# Patient Record
Sex: FEMALE | Employment: STUDENT | URBAN - METROPOLITAN AREA
[De-identification: names, ages, dates, MRNs, and addresses within clinical notes are randomized per-mention and may not be internally consistent; named-entity substitution may affect disease eponyms.]

---
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
## 2024-01-11 DIAGNOSIS — R42 DIZZINESS: Primary | ICD-10-CM

## 2024-01-11 LAB
BASOPHILS ABSOLUTE: 0.03 /ΜL
BASOPHILS RELATIVE PERCENT: 0.5 %
EOSINOPHILS ABSOLUTE: 0.19 /ΜL
EOSINOPHILS RELATIVE PERCENT: 3.4 %
HCT VFR BLD CALC: 39 % (ref 36–46)
HEMOGLOBIN: 12.6 G/DL (ref 12–16)
LYMPHOCYTES ABSOLUTE: 1.67 /ΜL
LYMPHOCYTES RELATIVE PERCENT: 29.8 %
MCH RBC QN AUTO: 28.1 PG
MCHC RBC AUTO-ENTMCNC: 32.3 G/DL
MCV RBC AUTO: 87.1 FL
MONOCYTES ABSOLUTE: 0.29 /ΜL
MONOCYTES RELATIVE PERCENT: 5.2 %
NEUTROPHILS ABSOLUTE: 3.4 /ΜL
NEUTROPHILS RELATIVE PERCENT: 60.7 %
PDW BLD-RTO: 13 %
PLATELET # BLD: 189 K/ΜL
PMV BLD AUTO: 9.7 FL
RBC # BLD: 4.48 10^6/ΜL
TSH SERPL DL<=0.05 MIU/L-ACNC: 2.38 UIU/ML
WBC # BLD: 5.6 10^3/ML

## 2024-01-18 DIAGNOSIS — R42 DIZZINESS: ICD-10-CM

## 2024-07-08 ENCOUNTER — TELEPHONE (OUTPATIENT)
Dept: URGENT CARE | Facility: CLINIC | Age: 19
End: 2024-07-08

## 2024-07-08 ENCOUNTER — OFFICE VISIT (OUTPATIENT)
Dept: URGENT CARE | Facility: CLINIC | Age: 19
End: 2024-07-08

## 2024-07-08 DIAGNOSIS — Z02.1 ENCOUNTER FOR PRE-EMPLOYMENT HEALTH SCREENING EXAMINATION: Primary | ICD-10-CM

## 2024-07-08 NOTE — PROGRESS NOTES
[facility] URGENT CARE   STEPH NOTE:      Name: Licha Wells, 19 y.o.    CSN:[id number]   MRN:[id number]    PCP: No primary care provider on file.    ALL:  Not on File    History:    Chief Complaint: No chief complaint on file.    Encounter Date: 7/8/2024      HPI: The history was obtained from the patient and mother. Licha is a 19 y.o. 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 Last Tb test was 2/2023 she thinks, and she mentions her reluctance to purchase/pay for services if she can receive it for free elsewhere given her experience with similar process in 2023.         PMHx:    No past medical history on file.           No current outpatient medications on file.     No current facility-administered medications for this visit.         PMSx:  No past surgical history on file.    Fam Hx: No family history on file.    SOC. Hx:     Social History     Socioeconomic History    Marital status: Not on file     Spouse name: Not on file    Number of children: Not on file    Years of education: Not on file    Highest education level: Not on file   Occupational History    Not on file   Tobacco Use    Smoking status: Not on file    Smokeless tobacco: Not on file   Substance and Sexual Activity    Alcohol use: Not on file    Drug use: Not on file    Sexual activity: Not on file   Other Topics Concern    Not on file   Social History Narrative    Not on file     Social Determinants of Health     Financial Resource Strain: Not on file   Food Insecurity: Not on file   Transportation Needs: Not on file   Physical Activity: Not on file   Stress: Not on file   Social Connections: Not on file   Intimate Partner Violence: Not on file   Housing Stability: Not on file         There were no vitals filed for this visit.             Physical Exam  Constitutional:       Appearance: She is normal weight.   Eyes:       Extraocular Movements: Extraocular movements intact.      Pupils: Pupils are equal, round, and reactive to light.   Cardiovascular:      Rate and Rhythm: Normal rate.   Musculoskeletal:         General: Normal range of motion.      Cervical back: Normal range of motion.   Neurological:      Mental Status: She is alert and oriented to person, place, and time.         ____________________________________________________________________    I did personally review Licha's past medical history, surgical history, social history, as well as family history (when relevant).  In this case, I also oversaw the her drug management by reviewing her medication list, allergy list, as well as the medications that I prescribed during the UC course and/or recommended as an out-patient (including possible OTC medications such as acetaminophen, NSAIDs , etc).    After reviewing the items above, I did look at previous medical documentation, such as recent hospitalizations, office visits, and/or recent consultations with PCP/specialist.                          SDOH:   Another factor that I considered in Licha's care was her Social Determinants of Health (SDOH). During this UC encounter, she did not have social determinants of health. Those SDOH influencing Licha's care are: none      _____________________________________________________________________      UC COURSE/MEDICAL DECISION MAKING:    Licha is a 19 y.o., who presents with a working diagnosis of   1. Encounter for pre-employment health screening examination      She's not sure if she needs the test completed. The appt was canceled on our end, and we discussed the option of investigating if CCU needs another test performed. Recommend she call if she still needs the exam, I can assist where needed & have the money recouped later.      Lionel Ordonez PA-C   Advanced Practice Provider  [facility] URGENT CARE